# Patient Record
Sex: FEMALE | Race: WHITE | URBAN - METROPOLITAN AREA
[De-identification: names, ages, dates, MRNs, and addresses within clinical notes are randomized per-mention and may not be internally consistent; named-entity substitution may affect disease eponyms.]

---

## 2021-12-22 ENCOUNTER — OFFICE VISIT (OUTPATIENT)
Dept: URBAN - METROPOLITAN AREA CLINIC 51 | Facility: CLINIC | Age: 31
End: 2021-12-22
Payer: COMMERCIAL

## 2021-12-22 DIAGNOSIS — H04.123 DRY EYE SYNDROME OF BILATERAL LACRIMAL GLANDS: Primary | ICD-10-CM

## 2021-12-22 DIAGNOSIS — H00.019 STYE OF EYELID: ICD-10-CM

## 2021-12-22 PROCEDURE — 99204 OFFICE O/P NEW MOD 45 MIN: CPT | Performed by: OPTOMETRIST

## 2021-12-22 RX ORDER — DOXYCYCLINE HYCLATE 100 MG/1
100 MG CAPSULE, GELATIN COATED ORAL
Qty: 30 | Refills: 0 | Status: INACTIVE
Start: 2021-12-22 | End: 2021-12-22

## 2021-12-22 RX ORDER — NEOMYCIN SULFATE, POLYMYXIN B SULFATE AND DEXAMETHASONE 3.5; 10000; 1 MG/ML; [USP'U]/ML; MG/ML
SUSPENSION OPHTHALMIC
Qty: 5 | Refills: 1 | Status: ACTIVE
Start: 2021-12-22

## 2021-12-22 ASSESSMENT — INTRAOCULAR PRESSURE
OD: 13
OS: 14

## 2021-12-22 ASSESSMENT — KERATOMETRY
OD: 44.90
OS: 44.84

## 2021-12-22 ASSESSMENT — VISUAL ACUITY: OD: 20/15

## 2022-01-24 ENCOUNTER — OFFICE VISIT (OUTPATIENT)
Dept: URBAN - METROPOLITAN AREA CLINIC 51 | Facility: CLINIC | Age: 32
End: 2022-01-24
Payer: COMMERCIAL

## 2022-01-24 DIAGNOSIS — L71.9 ROSACEA: Primary | ICD-10-CM

## 2022-01-24 PROCEDURE — 99214 OFFICE O/P EST MOD 30 MIN: CPT | Performed by: OPTOMETRIST

## 2022-01-24 RX ORDER — DOXYCYCLINE HYCLATE 100 MG/1
100 MG TABLET, COATED ORAL
Qty: 30 | Refills: 1 | Status: ACTIVE
Start: 2022-01-24

## 2022-01-24 NOTE — IMPRESSION/PLAN
Impression: Rosacea: L71.9. Plan: Ed pt that condition can cause styes, and causes dry eye. pt is breast feeding 
continue Maxitrol gtts BID OU, ocusoft lid cleansers, warm compresses
will prescribe Oral antibiotic, will be stopping breast feeding.

## 2022-04-21 ENCOUNTER — OFFICE VISIT (OUTPATIENT)
Dept: URBAN - METROPOLITAN AREA CLINIC 51 | Facility: CLINIC | Age: 32
End: 2022-04-21
Payer: COMMERCIAL

## 2022-04-21 DIAGNOSIS — L71.9 ROSACEA: ICD-10-CM

## 2022-04-21 DIAGNOSIS — H01.011 ULCERATIVE BLEPHARITIS OF RIGHT UPPER LID: Primary | ICD-10-CM

## 2022-04-21 DIAGNOSIS — H00.019 STYE OF EYELID: ICD-10-CM

## 2022-04-21 PROCEDURE — ALC20 AVENOVA LID CLEANSER 20ML: CUSTOM | Performed by: OPTOMETRIST

## 2022-04-21 PROCEDURE — 99214 OFFICE O/P EST MOD 30 MIN: CPT | Performed by: OPTOMETRIST

## 2022-04-21 RX ORDER — NEOMYCIN SULFATE, POLYMYXIN B SULFATE AND DEXAMETHASONE 3.5; 10000; 1 MG/G; [USP'U]/G; MG/G
OINTMENT OPHTHALMIC
Qty: 3.5 | Refills: 1 | Status: ACTIVE
Start: 2022-04-21

## 2022-04-21 RX ORDER — NEOMYCIN SULFATE, POLYMYXIN B SULFATE AND DEXAMETHASONE 3.5; 10000; 1 MG/ML; [USP'U]/ML; MG/ML
SUSPENSION OPHTHALMIC
Qty: 5 | Refills: 1 | Status: INACTIVE
Start: 2022-04-21 | End: 2022-04-21

## 2022-04-21 NOTE — IMPRESSION/PLAN
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 with Hordeolum upper lids Plan: *removed scab/clumps of crustiing from lash margins with sterile forcep. *cleaned lids with Avenova *applied Maxitrol Evan to lid margins in office Pt review to do the following for OD:
Dry warm compresses QID with Sarah's Mask Clean with Avenova QID directly to lid margins and rubbing gently to base of lash margins Maxitrol Evan applied to lid margins QID Unable to take oral Doxy (she's 3 months pregnant) f/u next week

## 2022-04-28 ENCOUNTER — OFFICE VISIT (OUTPATIENT)
Dept: URBAN - METROPOLITAN AREA CLINIC 51 | Facility: CLINIC | Age: 32
End: 2022-04-28
Payer: COMMERCIAL

## 2022-04-28 DIAGNOSIS — H01.011 ULCERATIVE BLEPHARITIS OF RIGHT UPPER LID: Primary | ICD-10-CM

## 2022-04-28 PROCEDURE — 99213 OFFICE O/P EST LOW 20 MIN: CPT | Performed by: OPTOMETRIST

## 2022-04-28 NOTE — IMPRESSION/PLAN
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 with Hordeolum upper lids Plan: Significant improvement. Pt review to cont the following for OD:
Dry warm compresses QID with Sarah's Mask Clean with Avenova QID directly to lid margins and rubbing gently to base of lash margins Maxitrol Evan applied to lid margins QID until tube is empty Unable to take oral Doxy (she's 3 months pregnant) RTC PRN.  Cont with dry WC and Avenova lid hygiene

## 2022-07-18 ENCOUNTER — OFFICE VISIT (OUTPATIENT)
Dept: URBAN - METROPOLITAN AREA CLINIC 51 | Facility: CLINIC | Age: 32
End: 2022-07-18
Payer: COMMERCIAL

## 2022-07-18 DIAGNOSIS — H00.11 CHALAZION OF RIGHT UPPER EYELID: Primary | ICD-10-CM

## 2022-07-18 PROCEDURE — 99214 OFFICE O/P EST MOD 30 MIN: CPT | Performed by: OPTOMETRIST

## 2022-07-18 NOTE — IMPRESSION/PLAN
Impression: Chalazion of right upper eyelid: H00.11. Plan: Encouraged pt to cont with dry warm compresses, Avenova lid hygiene, ADD Omega 3 FAs at least 3,000,mg (PRN DE3 recommended) and use Aquaphor nightly to lid margins.

## 2024-11-19 ENCOUNTER — OFFICE VISIT (OUTPATIENT)
Dept: URBAN - METROPOLITAN AREA CLINIC 51 | Facility: CLINIC | Age: 34
End: 2024-11-19
Payer: COMMERCIAL

## 2024-11-19 DIAGNOSIS — H00.012 HORDEOLUM EXTERNUM RIGHT LOWER EYELID: ICD-10-CM

## 2024-11-19 DIAGNOSIS — L71.9 ROSACEA: Primary | ICD-10-CM

## 2024-11-19 PROCEDURE — 92014 COMPRE OPH EXAM EST PT 1/>: CPT | Performed by: OPTOMETRIST

## 2024-11-19 RX ORDER — DOXYCYCLINE HYCLATE 100 MG/1
100 MG CAPSULE, GELATIN COATED ORAL
Qty: 60 | Refills: 0 | Status: ACTIVE
Start: 2024-11-19

## 2024-11-19 RX ORDER — NEOMYCIN SULFATE, POLYMYXIN B SULFATE AND DEXAMETHASONE 1; 3.5; 1 MG/G; MG/G; [USP'U]/G
OINTMENT OPHTHALMIC
Qty: 3.5 | Refills: 1 | Status: ACTIVE
Start: 2024-11-19

## 2024-11-19 ASSESSMENT — KERATOMETRY
OS: 45.00
OD: 45.13

## 2024-11-19 ASSESSMENT — VISUAL ACUITY
OS: 20/20
OD: 20/20

## 2024-11-19 ASSESSMENT — INTRAOCULAR PRESSURE
OS: 12
OD: 12
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
